# Patient Record
(demographics unavailable — no encounter records)

---

## 2025-01-27 NOTE — REVIEW OF SYSTEMS
[Recent Change In Weight] : ~T recent weight change [Insomnia] : insomnia [Depression] : depression [Negative] : Neurological [Suicidal] : not suicidal [Anxiety] : no anxiety [FreeTextEntry2] : Weight gain

## 2025-01-27 NOTE — HISTORY OF PRESENT ILLNESS
[FreeTextEntry1] : CPE [de-identified] : This is a 58F with PMHx of treatment-resistant depression (on zoloft, gabapentin/ f/w psych), RASHEED who is here for CPE and ongoing chronic symptoms of fatigue attributed to long covid. Also has treatment-resistant depression.  1) Fatigue attributed potentially to long covid: She had a 10-day long acute COVID episode 12/2023 and has been experiencing brain fog and severe fatigue ever since. She has a friend who has similar symptoms who endorses having long covid. Her psychiatrist does not believe that it is attributed to her psych medications.  1) Treatment resistant depression: Previously tried wellbutrin, paxil, lexapro, celexa, prozac, elevil without much symptom relief. She reports chronic fatigue which is superimposed on the depressive symptoms. Loss of interest in day to day activities, +oversleeps and takes various 2-hour naps during the day that interfere with her meal schedule. Has social support (friends in the area). 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 Also tried amphetamines/vyvanse for ADHD but did not like it (reports feeling "too wired"). History of opioid addiction - now on suboxone but has been clean for 15 years and would like to wean off of it (working on this with psychiatrist). Not amenable to ketamine therapy or related therapies due to history of drug abuse. Currently goes to AA.   Diet/exercise: Feels she eats poorly because she sleeps irregularly. Doesnt exercise.  Social history: Lives by herself,  . primary caretaker . born and raised in [place] - previously a .   Sexually active/Last menstrual period: Colposcopy 12/2024 +HPV, follows with Dr Mitesh Garay.  Postmenopausal as of 2015 (age 48).

## 2025-01-27 NOTE — PHYSICAL EXAM
[Normal] : no joint swelling and grossly normal strength and tone [Speech Grossly Normal] : speech grossly normal [Normal Affect] : the affect was normal [Alert and Oriented x3] : oriented to person, place, and time [Normal Mood] : the mood was normal [Normal Insight/Judgement] : insight and judgment were intact [de-identified] : Normal affect but teary

## 2025-01-27 NOTE — ASSESSMENT
[FreeTextEntry1] : HEALTH CARE MAINTENANCE - Influenza vaccine: Defers - Covid vaccine: Defers - HIV: Defer - HEP C: Defer - Pneumococcal:  Discuss at [facility] - Shingrix: Discuss at [facility] - TDAP: Discuss [facility] - Colonoscopy: Direct c-scope screening today - Mammogram: ORDERED; Due 2025 - Pap smear: HPV+ 12/2024 s/p colposcopy; repeat every 6 months f/w GYN   RTC 3 -4 weeks to discuss bloodwork, chronic fatigue, and preventative screenings [ ] flu shot at next visit

## 2025-03-03 NOTE — HISTORY OF PRESENT ILLNESS
[FreeTextEntry1] : chronic fatigue f/u [de-identified] : This is a 58F with PMHx of treatment-resistant depression (on zoloft, gabapentin/ f/w psych), RASHEED, HLD, newly diagnosed hypothyroidism who is here for f/u. She was started on synthroid 88mcg and lipitor 80mg with good response. She did have 12lb weight gain this month, notes she does not adhere to strict diet (notes she "eats ice cream whenever she wants") and does not exercise as much but willing to. Her psychiatrist Dr. Griggs is inquiring about GLP-1 given her opioid use d/o history and may be a good conduit to get her off of suboxone (which she wants).   She is also amenable to quit smoking, inquiring about chantix.

## 2025-03-03 NOTE — REVIEW OF SYSTEMS
[Recent Change In Weight] : ~T recent weight change [Negative] : Psychiatric [FreeTextEntry2] : 12lb weight gain

## 2025-03-03 NOTE — HISTORY OF PRESENT ILLNESS
[FreeTextEntry1] : chronic fatigue f/u [de-identified] : This is a 58F with PMHx of treatment-resistant depression (on zoloft, gabapentin/ f/w psych), RASHEED, HLD, newly diagnosed hypothyroidism who is here for f/u. She was started on synthroid 88mcg and lipitor 80mg with good response. She did have 12lb weight gain this month, notes she does not adhere to strict diet (notes she "eats ice cream whenever she wants") and does not exercise as much but willing to. Her psychiatrist Dr. Griggs is inquiring about GLP-1 given her opioid use d/o history and may be a good conduit to get her off of suboxone (which she wants).   She is also amenable to quit smoking, inquiring about chantix.

## 2025-03-03 NOTE — HISTORY OF PRESENT ILLNESS
[FreeTextEntry1] : chronic fatigue f/u [de-identified] : This is a 58F with PMHx of treatment-resistant depression (on zoloft, gabapentin/ f/w psych), RASHEED, HLD, newly diagnosed hypothyroidism who is here for f/u. She was started on synthroid 88mcg and lipitor 80mg with good response. She did have 12lb weight gain this month, notes she does not adhere to strict diet (notes she "eats ice cream whenever she wants") and does not exercise as much but willing to. Her psychiatrist Dr. Griggs is inquiring about GLP-1 given her opioid use d/o history and may be a good conduit to get her off of suboxone (which she wants).   She is also amenable to quit smoking, inquiring about chantix.

## 2025-03-11 NOTE — PHYSICAL EXAM
[No Respiratory Distress] : no respiratory distress  [No Accessory Muscle Use] : no accessory muscle use [Normal] : no respiratory distress, lungs were clear to auscultation bilaterally and no accessory muscle use

## 2025-03-12 NOTE — HISTORY OF PRESENT ILLNESS
[Home] : at home, [unfilled] , at the time of the visit. [Medical Office: ([facility])___] : at the medical office located in  [Telehealth (audio & video)] : This visit was provided via telehealth using real-time 2-way audio visual technology. [Verbal consent obtained from patient] : the patient, [unfilled] [FreeTextEntry1] : follow up [de-identified] : This is a 58F with PMHx of treatment-resistant depression (on zoloft, gabapentin/ f/w psych), RASHEED, HLD, newly diagnosed hypothyroidism who is here for f/u. Now concerned for sleep issues - she wants to nap a lot during the day but then when she gets home she's unable to sleep. She attributes her stress to taking care of her mother. She has a talktherapist 2x/week (Dr Tk Daniels) about these issues. She's interested in talking about chronic fatigue therapist.   Discussed with patient: You have chosen to receive care through the use of tele-media. Tele-media enables health care providers at different locations to provide safe, effective and convenient care through the use of technology. Please note that this is a billable encounter. As with any health care service, there are risks associated with the use of tele-media, including equipment failure, poor image and/or resolution, and  issues. You understand that I cannot physically examine you and that you may need to come to the clinic to complete the assessment. Patient agreed verbally to understanding the risks and benefits of tele-media as explained. All questions regarding tele-media encounters were answered. Total time spent: [10]

## 2025-03-12 NOTE — HISTORY OF PRESENT ILLNESS
[Home] : at home, [unfilled] , at the time of the visit. [Medical Office: ([facility])___] : at the medical office located in  [Telehealth (audio & video)] : This visit was provided via telehealth using real-time 2-way audio visual technology. [Verbal consent obtained from patient] : the patient, [unfilled] [FreeTextEntry1] : follow up [de-identified] : This is a 58F with PMHx of treatment-resistant depression (on zoloft, gabapentin/ f/w psych), RASHEED, HLD, newly diagnosed hypothyroidism who is here for f/u. Now concerned for sleep issues - she wants to nap a lot during the day but then when she gets home she's unable to sleep. She attributes her stress to taking care of her mother. She has a talktherapist 2x/week (Dr Tk Daniels) about these issues. She's interested in talking about chronic fatigue therapist.   Discussed with patient: You have chosen to receive care through the use of tele-media. Tele-media enables health care providers at different locations to provide safe, effective and convenient care through the use of technology. Please note that this is a billable encounter. As with any health care service, there are risks associated with the use of tele-media, including equipment failure, poor image and/or resolution, and  issues. You understand that I cannot physically examine you and that you may need to come to the clinic to complete the assessment. Patient agreed verbally to understanding the risks and benefits of tele-media as explained. All questions regarding tele-media encounters were answered. Total time spent: [10]